# Patient Record
Sex: MALE | Race: WHITE | NOT HISPANIC OR LATINO | URBAN - METROPOLITAN AREA
[De-identification: names, ages, dates, MRNs, and addresses within clinical notes are randomized per-mention and may not be internally consistent; named-entity substitution may affect disease eponyms.]

---

## 2022-11-23 ENCOUNTER — OFFICE (OUTPATIENT)
Dept: URBAN - METROPOLITAN AREA CLINIC 93 | Facility: CLINIC | Age: 62
Setting detail: OPHTHALMOLOGY
End: 2022-11-23
Payer: MEDICARE

## 2022-11-23 DIAGNOSIS — E11.3293: ICD-10-CM

## 2022-11-23 DIAGNOSIS — H35.033: ICD-10-CM

## 2022-11-23 PROCEDURE — 92250 FUNDUS PHOTOGRAPHY W/I&R: CPT | Performed by: OPHTHALMOLOGY

## 2022-11-23 PROCEDURE — 92201 OPSCPY EXTND RTA DRAW UNI/BI: CPT | Performed by: OPHTHALMOLOGY

## 2022-11-23 PROCEDURE — 92014 COMPRE OPH EXAM EST PT 1/>: CPT | Performed by: OPHTHALMOLOGY

## 2022-11-23 ASSESSMENT — CONFRONTATIONAL VISUAL FIELD TEST (CVF)
OS_FINDINGS: FULL
OD_FINDINGS: FULL

## 2022-11-23 ASSESSMENT — VISUAL ACUITY
OS_BCVA: 20/30-
OD_BCVA: 20/20-

## 2023-12-08 ENCOUNTER — APPOINTMENT (OUTPATIENT)
Dept: ORTHOPEDIC SURGERY | Facility: CLINIC | Age: 63
End: 2023-12-08
Payer: MEDICARE

## 2023-12-08 VITALS — WEIGHT: 228 LBS | BODY MASS INDEX: 38.93 KG/M2 | HEIGHT: 64 IN

## 2023-12-08 DIAGNOSIS — E11.9 TYPE 2 DIABETES MELLITUS W/OUT COMPLICATIONS: ICD-10-CM

## 2023-12-08 PROBLEM — Z00.00 ENCOUNTER FOR PREVENTIVE HEALTH EXAMINATION: Status: ACTIVE | Noted: 2023-12-08

## 2023-12-08 PROCEDURE — 73503 X-RAY EXAM HIP UNI 4/> VIEWS: CPT | Mod: RT

## 2023-12-08 PROCEDURE — 99203 OFFICE O/P NEW LOW 30 MIN: CPT

## 2023-12-19 ENCOUNTER — OFFICE (OUTPATIENT)
Facility: LOCATION | Age: 63
Setting detail: OPHTHALMOLOGY
End: 2023-12-19
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
## 2023-12-19 PROCEDURE — NO SHOW FE NO SHOW FEE: Performed by: OPHTHALMOLOGY

## 2024-03-29 ENCOUNTER — APPOINTMENT (OUTPATIENT)
Dept: ORTHOPEDIC SURGERY | Facility: CLINIC | Age: 64
End: 2024-03-29
Payer: MEDICARE

## 2024-03-29 VITALS — HEIGHT: 64 IN | WEIGHT: 228 LBS | BODY MASS INDEX: 38.93 KG/M2

## 2024-03-29 PROCEDURE — 99213 OFFICE O/P EST LOW 20 MIN: CPT

## 2024-03-29 NOTE — HISTORY OF PRESENT ILLNESS
[0] : 0 [de-identified] : 12/08/2023 Mr. MARSHALL GARY is a 63 year male that comes in today with a chief complaint of right hip. + right hip pain after stretching. + limping. pain is improving and now pain at this time.  03/29/2024 pain continues on the right side [] : no

## 2024-03-29 NOTE — IMAGING
[de-identified] : Constitutional: well developed and well nourished, able to communicate Cardiovascular: Peripheral vascular exam is grossly normal Neurologic: Alert and oriented, no acute distress. Skin: normal skin with no ulcers, rashes, or lesions Pulmonary: No respiratory distress, breathing comfortably on room air Lymphatics: No obvious lymphadenopathy or lymphedema in areas examined  LOWER EXTREMITY/ RIGHT HIP EXAM Standing pelvic alignment: Symmetric with no Trendelenburg Atrophy: none Ecchymosis/swelling: none  Range of Motion Hip: Flexion/extension/ER/IR     / EX 20/ ER 45/ IR 0 / AB 60 /AD 20 Impingement with flexion adduction and internal rotation: POS Contracture: none Snapping hip: negative Greater trochanter: no tenderness  Neurovascular Distal extremities: warm to touch Sensation to light touch: intact Muscle strength: 5/5  [id number] Xrays of the Right hip 3v were taken demonstrating tonnis grade 3 Osteoarthritis with joint space collapse, sclerosis, osteophytes, and subchondral cysts

## 2024-03-29 NOTE — ASSESSMENT
[FreeTextEntry1] : 63 year M with right hip OA (asymptomatic) and hip strain - reassurance provided and PRN fu  03/29/2024  deferred NSAIDs due to high blood pressure medication Dr. esparza for u/s guided injection and 1 month fu after

## 2024-04-11 ENCOUNTER — APPOINTMENT (OUTPATIENT)
Dept: ORTHOPEDIC SURGERY | Facility: CLINIC | Age: 64
End: 2024-04-11
Payer: MEDICARE

## 2024-04-11 PROCEDURE — 99213 OFFICE O/P EST LOW 20 MIN: CPT

## 2024-04-11 NOTE — IMAGING
[de-identified] : L hip: - No obvious deformity or swelling - No tenderness to palpation of greater trochanter - No pain with full flexion, internal/external rotation - 5/5 strength hip flexion, abduction, adduction - Negative JACOB, FADIR - Negative Log roll - Distally neurovascularly intact   R hip: - No obvious deformity or swelling - No tenderness to palpation of greater trochanter - Pain with full flexion. Limited internal/external rotation - 5/5 strength hip flexion, abduction, adduction - Pain with FADIR. Limited JACOB - Negative Log roll - Distally neurovascularly intact

## 2024-04-11 NOTE — PHYSICAL EXAM
[de-identified] : Constitutional: Well developed, well nourished, able to communicate Neuro: Normal sensation, No focal deficits Skin: Intact CV: Peripheral vascular exam grossly normal Pulm: No signs of respiratory distress Psych: Oriented, normal mood and affect

## 2024-04-11 NOTE — HISTORY OF PRESENT ILLNESS
[de-identified] : 04/11/24: Patient is a 63-year-old male with right hip osteoarthritis presenting for right hip corticosteroid injection after referral by Dr. Louise.  He states that his A1c was checked approximately 1 month ago and was 9.4.  States several sugars in the last week over 200s.

## 2024-05-10 ENCOUNTER — APPOINTMENT (OUTPATIENT)
Dept: ORTHOPEDIC SURGERY | Facility: CLINIC | Age: 64
End: 2024-05-10

## 2024-05-21 ENCOUNTER — APPOINTMENT (OUTPATIENT)
Dept: ORTHOPEDIC SURGERY | Facility: CLINIC | Age: 64
End: 2024-05-21
Payer: MEDICARE

## 2024-05-21 VITALS — WEIGHT: 228 LBS | HEIGHT: 64 IN | BODY MASS INDEX: 38.93 KG/M2

## 2024-05-21 PROCEDURE — 99213 OFFICE O/P EST LOW 20 MIN: CPT

## 2024-05-21 NOTE — ASSESSMENT
[FreeTextEntry1] : 63 year M with right hip OA (asymptomatic) and hip strain - reassurance provided and PRN fu  03/29/2024  deferred NSAIDs due to high blood pressure medication Dr. esparza for u/s guided injection and 1 month fu after  05/21/2024 right hip injection deferred with Dr. Esparza due to elevated A1c currently it is now 6.5 and thus will  have pt fu with Dr. Esparza for u/s guided injection 1 month fu after CSI

## 2024-05-21 NOTE — IMAGING
[de-identified] : Constitutional: well developed and well nourished, able to communicate Cardiovascular: Peripheral vascular exam is grossly normal Neurologic: Alert and oriented, no acute distress. Skin: normal skin with no ulcers, rashes, or lesions Pulmonary: No respiratory distress, breathing comfortably on room air Lymphatics: No obvious lymphadenopathy or lymphedema in areas examined  LOWER EXTREMITY/ RIGHT HIP EXAM Standing pelvic alignment: Symmetric with no Trendelenburg Atrophy: none Ecchymosis/swelling: none  Range of Motion Hip: Flexion/extension/ER/IR     / EX 20/ ER 45/ IR 0 / AB 60 /AD 20 Impingement with flexion adduction and internal rotation: POS Contracture: none Snapping hip: negative Greater trochanter: no tenderness  Neurovascular Distal extremities: warm to touch Sensation to light touch: intact Muscle strength: 5/5  [id number] Xrays of the Right hip 3v were taken demonstrating tonnis grade 3 Osteoarthritis with joint space collapse, sclerosis, osteophytes, and subchondral cysts

## 2024-05-21 NOTE — HISTORY OF PRESENT ILLNESS
[0] : 0 [de-identified] : 12/08/2023 Mr. MARSHALL GARY is a 63 year male that comes in today with a chief complaint of right hip. + right hip pain after stretching. + limping. pain is improving and now pain at this time.  03/29/2024 pain continues on the right side  05/21/2024 MARSHALL GARY is here for follow up. pt states pain decreased. A1c 9.2 and thus did not do the injection. A1c 6.5   [] : no

## 2024-05-23 ENCOUNTER — APPOINTMENT (OUTPATIENT)
Dept: ORTHOPEDIC SURGERY | Facility: CLINIC | Age: 64
End: 2024-05-23
Payer: MEDICARE

## 2024-05-23 PROCEDURE — J3490M: CUSTOM

## 2024-05-23 PROCEDURE — 99213 OFFICE O/P EST LOW 20 MIN: CPT | Mod: 25

## 2024-05-23 PROCEDURE — 20611 DRAIN/INJ JOINT/BURSA W/US: CPT | Mod: RT

## 2024-05-23 NOTE — PHYSICAL EXAM
[de-identified] : Constitutional: Well developed, well nourished, able to communicate Neuro: Normal sensation, No focal deficits Skin: Intact CV: Peripheral vascular exam grossly normal Pulm: No signs of respiratory distress Psych: Oriented, normal mood and affect

## 2024-05-23 NOTE — HISTORY OF PRESENT ILLNESS
[7] : 7 [0] : 0 [Sharp] : sharp [Shooting] : shooting [Tightness] : tightness [Intermittent] : intermittent [Household chores] : household chores [Leisure] : leisure [Nothing helps with pain getting better] : Nothing helps with pain getting better [Standing] : standing [Walking] : walking [de-identified] : 04/11/24: Patient is a 63-year-old male with right hip osteoarthritis presenting for right hip corticosteroid injection after referral by Dr. Louise.  He states that his A1c was checked approximately 1 month ago and was 9.4.  States several sugars in the last week over 200s.  05/23/24 - pt is here for follow up on Rt hip pain, slightly improved , pain continues travels to thigh. a1c now down to 6.5. would like to proceed with CSI.  [] : no [FreeTextEntry1] : RT hip [FreeTextEntry7] : thigh

## 2024-05-23 NOTE — IMAGING
[de-identified] : L hip: - No obvious deformity or swelling - No tenderness to palpation of greater trochanter - No pain with full flexion, internal/external rotation - 5/5 strength hip flexion, abduction, adduction - Negative JACOB, FADIR - Negative Log roll - Distally neurovascularly intact   R hip: - No obvious deformity or swelling - No tenderness to palpation of greater trochanter - Pain with full flexion. Limited internal/external rotation - 5/5 strength hip flexion, abduction, adduction - Pain with FADIR. Limited JACOB - Negative Log roll - Distally neurovascularly intact

## 2024-05-23 NOTE — ASSESSMENT
[FreeTextEntry1] : Presenting as consult from Dr. Louise for right hip ultrasound-guided corticosteroid injection for right hip osteoarthritis.  His exam and x-rays are consistent with osteoarthritis.  a1c now much improved from prior.  - R hip US guided CSI given, tolerated well - Discussed use of tylenol and ice as needed for injection site relief - Continue activities as tolerated - Follow up with Dr. Louise in 1 month as recommended.

## 2024-05-23 NOTE — IMAGING
[de-identified] : L hip: - No obvious deformity or swelling - No tenderness to palpation of greater trochanter - No pain with full flexion, internal/external rotation - 5/5 strength hip flexion, abduction, adduction - Negative JACOB, FADIR - Negative Log roll - Distally neurovascularly intact   R hip: - No obvious deformity or swelling - No tenderness to palpation of greater trochanter - Pain with full flexion. Limited internal/external rotation - 5/5 strength hip flexion, abduction, adduction - Pain with FADIR. Limited JACOB - Negative Log roll - Distally neurovascularly intact

## 2024-05-23 NOTE — HISTORY OF PRESENT ILLNESS
[7] : 7 [0] : 0 [Sharp] : sharp [Shooting] : shooting [Tightness] : tightness [Intermittent] : intermittent [Household chores] : household chores [Leisure] : leisure [Nothing helps with pain getting better] : Nothing helps with pain getting better [Standing] : standing [Walking] : walking [de-identified] : 04/11/24: Patient is a 63-year-old male with right hip osteoarthritis presenting for right hip corticosteroid injection after referral by Dr. Louise.  He states that his A1c was checked approximately 1 month ago and was 9.4.  States several sugars in the last week over 200s.  05/23/24 - pt is here for follow up on Rt hip pain, slightly improved , pain continues travels to thigh. a1c now down to 6.5. would like to proceed with CSI.  [] : no [FreeTextEntry1] : RT hip [FreeTextEntry7] : thigh

## 2024-05-23 NOTE — PHYSICAL EXAM
[de-identified] : Constitutional: Well developed, well nourished, able to communicate Neuro: Normal sensation, No focal deficits Skin: Intact CV: Peripheral vascular exam grossly normal Pulm: No signs of respiratory distress Psych: Oriented, normal mood and affect

## 2024-06-20 ENCOUNTER — APPOINTMENT (OUTPATIENT)
Dept: ORTHOPEDIC SURGERY | Facility: CLINIC | Age: 64
End: 2024-06-20
Payer: MEDICARE

## 2024-06-20 VITALS — BODY MASS INDEX: 38.93 KG/M2 | HEIGHT: 64 IN | WEIGHT: 228 LBS

## 2024-06-20 DIAGNOSIS — M16.11 UNILATERAL PRIMARY OSTEOARTHRITIS, RIGHT HIP: ICD-10-CM

## 2024-06-20 PROCEDURE — 99213 OFFICE O/P EST LOW 20 MIN: CPT

## 2024-06-20 NOTE — IMAGING
[de-identified] : Constitutional: well developed and well nourished, able to communicate Cardiovascular: Peripheral vascular exam is grossly normal Neurologic: Alert and oriented, no acute distress. Skin: normal skin with no ulcers, rashes, or lesions Pulmonary: No respiratory distress, breathing comfortably on room air Lymphatics: No obvious lymphadenopathy or lymphedema in areas examined  LOWER EXTREMITY/ RIGHT HIP EXAM Standing pelvic alignment: Symmetric with no Trendelenburg Atrophy: none Ecchymosis/swelling: none  Range of Motion Hip: Flexion/extension/ER/IR     / EX 20/ ER 45/ IR 0 / AB 60 /AD 20 Impingement with flexion adduction and internal rotation: POS Contracture: none Snapping hip: negative Greater trochanter: no tenderness  Neurovascular Distal extremities: warm to touch Sensation to light touch: intact Muscle strength: 5/5  [id number] Xrays of the Right hip 3v were taken demonstrating tonnis grade 3 Osteoarthritis with joint space collapse, sclerosis, osteophytes, and subchondral cysts

## 2024-06-20 NOTE — ASSESSMENT
[FreeTextEntry1] : 63 year M with right hip OA (asymptomatic) and hip strain - reassurance provided and PRN fu  03/29/2024  deferred NSAIDs due to high blood pressure medication Dr. esparza for u/s guided injection and 1 month fu after  05/21/2024 right hip injection deferred with Dr. Esparza due to elevated A1c currently it is now 6.5 and thus will  have pt fu with Dr. Esparza for u/s guided injection 1 month fu after CSI  06/20/2024: Significant improvement with hip CSI Follow up PRN-can have repeat CSI with Dr. Esparza in 2 months

## 2024-06-20 NOTE — HISTORY OF PRESENT ILLNESS
[0] : 0 [Rest] : rest [Meds] : meds [Walking] : walking [Stairs] : stairs [de-identified] : 12/08/2023 Mr. MARSHALL GARY is a 63 year male that comes in today with a chief complaint of right hip. + right hip pain after stretching. + limping. pain is improving and now pain at this time.  03/29/2024 pain continues on the right side  05/21/2024 MARSHALL GARY is here for follow up. pt states pain decreased. A1c 9.2 and thus did not do the injection. A1c 6.5 06/20/24: Patient here to follow up on right hip pain. Had CSI with Dr. Seymour one month prior. States had good relief with CSI.    [] : no [FreeTextEntry1] : RT HIP

## 2024-08-30 ENCOUNTER — APPOINTMENT (OUTPATIENT)
Dept: ORTHOPEDIC SURGERY | Facility: CLINIC | Age: 64
End: 2024-08-30